# Patient Record
Sex: MALE | Race: AMERICAN INDIAN OR ALASKA NATIVE
[De-identification: names, ages, dates, MRNs, and addresses within clinical notes are randomized per-mention and may not be internally consistent; named-entity substitution may affect disease eponyms.]

---

## 2019-04-01 ENCOUNTER — HOSPITAL ENCOUNTER (OUTPATIENT)
Dept: HOSPITAL 5 - OR | Age: 29
Discharge: HOME | End: 2019-04-01
Attending: UROLOGY
Payer: COMMERCIAL

## 2019-04-01 VITALS — DIASTOLIC BLOOD PRESSURE: 63 MMHG | SYSTOLIC BLOOD PRESSURE: 105 MMHG

## 2019-04-01 DIAGNOSIS — N41.9: ICD-10-CM

## 2019-04-01 DIAGNOSIS — F41.9: ICD-10-CM

## 2019-04-01 DIAGNOSIS — Z88.5: ICD-10-CM

## 2019-04-01 DIAGNOSIS — R33.9: ICD-10-CM

## 2019-04-01 DIAGNOSIS — K21.9: ICD-10-CM

## 2019-04-01 DIAGNOSIS — Z79.899: ICD-10-CM

## 2019-04-01 DIAGNOSIS — Z87.891: ICD-10-CM

## 2019-04-01 DIAGNOSIS — Z87.440: ICD-10-CM

## 2019-04-01 DIAGNOSIS — N40.0: Primary | ICD-10-CM

## 2019-04-01 DIAGNOSIS — F32.9: ICD-10-CM

## 2019-04-01 PROCEDURE — 52005 CYSTO W/URTRL CATHJ: CPT

## 2019-04-01 PROCEDURE — C1758 CATHETER, URETERAL: HCPCS

## 2019-04-01 PROCEDURE — 74420 UROGRAPHY RTRGR +-KUB: CPT

## 2019-04-01 PROCEDURE — A4217 STERILE WATER/SALINE, 500 ML: HCPCS

## 2019-04-01 PROCEDURE — C1769 GUIDE WIRE: HCPCS

## 2019-04-01 RX ADMIN — MIDAZOLAM NR MG: 1 INJECTION INTRAMUSCULAR; INTRAVENOUS at 09:32

## 2019-04-01 RX ADMIN — MIDAZOLAM NR MG: 1 INJECTION INTRAMUSCULAR; INTRAVENOUS at 09:08

## 2019-04-01 NOTE — OPERATIVE REPORT
INDICATIONS:  The patient is a 28-year-old gentleman who has been told for quite

some time he has urinary and prostate issues.  His examination was unremarkable.

 He has been treated with antibiotics.  He had some sort of procedure years ago.

 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  He now presents for cystoscopy.  He is

worried about prostate issues.



DESCRIPTION OF PROCEDURE:  The patient was brought to the operating room and

placed on the operating table.  Following induction of anesthesia, placed in

lithotomy position, and prepped and draped in usual sterile fashion. 

Cystourethroscopy showed normal urethra, normal open bladder neck.  Prostatic

urethra was not long, measured approximately 2.5-3 cm.  Bladder was not

trabeculated.  There were no bladder lesions.  Retrograde showed no

hydronephrosis, no dilatation, good filling and drainage.  The patient tolerated

the procedure well.  No significant complication.  He was brought to recovery in

stable condition.





DD: 04/01/2019 11:35

DT: 04/01/2019 12:33

JOB# [id number]  [id number]

MARIO/NIKKY

## 2019-04-01 NOTE — FLUOROSCOPY REPORT
FLUOROSCOPY RETROGRADE UROGRAPHY:



HISTORY: Enlarged prostate, urinary retention.



FINDINGS: Fluoroscopy was provided by radiology during retrograde 

urography by the urologist. 5 fluoroscopic images were captured.



There is adequate filling of the ureters and intrarenal collecting 

systems with no filling defects or anatomic abnormalities identified. 

Please correlate with the procedural report if needed.



IMPRESSION: Retrograde pyelograms within normal limits.

## 2019-04-01 NOTE — ANESTHESIA CONSULTATION
Anesthesia Consult and Med Hx





- Airway


Anesthetic Teeth Evaluation: Good


ROM Head & Neck: Adequate


Mental/Hyoid Distance: Adequate


Mallampati Class: Class II


Intubation Access Assessment: Good





- Pulmonary Exam


CTA: Yes





- Cardiac Exam


Cardiac Exam: RRR





- Pre-Operative Health Status


ASA Pre-Surgery Classification: ASA2


Proposed Anesthetic Plan: General





- Pulmonary


Hx Smoking: Yes (STOPPED X 1 YRS ( 1 /2 PPD))


Hx Sleep Apnea: No (ROBERT PRE SCREEN LOW RISK.)





- Cardiovascular System


Hx Hypertension: No





- Other Systems


Hx Cancer: No

## 2019-04-01 NOTE — DISCHARGE SUMMARY
Short Stay Discharge Plan


Activity: no restrictions, other


Weight Bearing Status: Full Weight Bearing


Diet: regular


Special Instructions: other (inc fluids )


Follow up with: 


PRIMARY CARE,MD [Primary Care Provider] - 7 Days


WINDY LUCIO MD [Staff Physician] - 14 Days

## 2019-04-01 NOTE — POST OPERATIVE NOTE
Date of procedure: 04/01/19


Pre-op diagnosis: "prostatitis"


Post-op diagnosis: same


Findings: 





normal 


Procedure: 





cysto rpgs 


Anesthesia: GETA


Surgeon: WINDY LUCIO


Estimated blood loss: none


Pathology: none


Condition: stable


Disposition: PACU

## 2020-03-03 ENCOUNTER — HOSPITAL ENCOUNTER (EMERGENCY)
Dept: HOSPITAL 5 - ED | Age: 30
Discharge: HOME | End: 2020-03-03
Payer: COMMERCIAL

## 2020-03-03 VITALS — SYSTOLIC BLOOD PRESSURE: 156 MMHG | DIASTOLIC BLOOD PRESSURE: 78 MMHG

## 2020-03-03 DIAGNOSIS — Z88.5: ICD-10-CM

## 2020-03-03 DIAGNOSIS — Z79.899: ICD-10-CM

## 2020-03-03 DIAGNOSIS — F41.0: Primary | ICD-10-CM

## 2020-03-03 DIAGNOSIS — F17.200: ICD-10-CM

## 2020-03-03 DIAGNOSIS — K21.9: ICD-10-CM

## 2020-03-03 DIAGNOSIS — E87.6: ICD-10-CM

## 2020-03-03 DIAGNOSIS — F41.9: ICD-10-CM

## 2020-03-03 DIAGNOSIS — F12.10: ICD-10-CM

## 2020-03-03 DIAGNOSIS — Z98.890: ICD-10-CM

## 2020-03-03 LAB
ALBUMIN SERPL-MCNC: 4.9 G/DL (ref 3.9–5)
ALT SERPL-CCNC: 20 UNITS/L (ref 7–56)
BASOPHILS # (AUTO): 0.1 K/MM3 (ref 0–0.1)
BASOPHILS NFR BLD AUTO: 1.1 % (ref 0–1.8)
BENZODIAZEPINES SCREEN,URINE: (no result)
BILIRUB UR QL STRIP: (no result)
BLOOD UR QL VISUAL: (no result)
BUN SERPL-MCNC: 11 MG/DL (ref 9–20)
BUN SERPL-MCNC: 11 MG/DL (ref 9–20)
BUN/CREAT SERPL: 14 %
BUN/CREAT SERPL: 14 %
CALCIUM SERPL-MCNC: 9.8 MG/DL (ref 8.4–10.2)
CALCIUM SERPL-MCNC: 9.9 MG/DL (ref 8.4–10.2)
EOSINOPHIL # BLD AUTO: 0 K/MM3 (ref 0–0.4)
EOSINOPHIL NFR BLD AUTO: 0.4 % (ref 0–4.3)
HCT VFR BLD CALC: 45.4 % (ref 35.5–45.6)
HEMOLYSIS INDEX: 14
HEMOLYSIS INDEX: 15
HGB BLD-MCNC: 15.6 GM/DL (ref 11.8–15.2)
LYMPHOCYTES # BLD AUTO: 2.2 K/MM3 (ref 1.2–5.4)
LYMPHOCYTES NFR BLD AUTO: 26.7 % (ref 13.4–35)
MCHC RBC AUTO-ENTMCNC: 34 % (ref 32–34)
MCV RBC AUTO: 93 FL (ref 84–94)
METHADONE SCREEN,URINE: (no result)
MONOCYTES # (AUTO): 0.4 K/MM3 (ref 0–0.8)
MONOCYTES % (AUTO): 5.1 % (ref 0–7.3)
MUCOUS THREADS #/AREA URNS HPF: (no result) /HPF
OPIATE SCREEN,URINE: (no result)
PH UR STRIP: 7 [PH] (ref 5–7)
PLATELET # BLD: 287 K/MM3 (ref 140–440)
PROT UR STRIP-MCNC: (no result) MG/DL
RBC # BLD AUTO: 4.9 M/MM3 (ref 3.65–5.03)
RBC #/AREA URNS HPF: < 1 /HPF (ref 0–6)
UROBILINOGEN UR-MCNC: < 2 MG/DL (ref ?–2)
WBC #/AREA URNS HPF: < 1 /HPF (ref 0–6)

## 2020-03-03 PROCEDURE — 93010 ELECTROCARDIOGRAM REPORT: CPT

## 2020-03-03 PROCEDURE — 80048 BASIC METABOLIC PNL TOTAL CA: CPT

## 2020-03-03 PROCEDURE — 80053 COMPREHEN METABOLIC PANEL: CPT

## 2020-03-03 PROCEDURE — 99284 EMERGENCY DEPT VISIT MOD MDM: CPT

## 2020-03-03 PROCEDURE — 36415 COLL VENOUS BLD VENIPUNCTURE: CPT

## 2020-03-03 PROCEDURE — 80320 DRUG SCREEN QUANTALCOHOLS: CPT

## 2020-03-03 PROCEDURE — 93005 ELECTROCARDIOGRAM TRACING: CPT

## 2020-03-03 PROCEDURE — G0480 DRUG TEST DEF 1-7 CLASSES: HCPCS

## 2020-03-03 PROCEDURE — 81001 URINALYSIS AUTO W/SCOPE: CPT

## 2020-03-03 PROCEDURE — 70450 CT HEAD/BRAIN W/O DYE: CPT

## 2020-03-03 PROCEDURE — 80307 DRUG TEST PRSMV CHEM ANLYZR: CPT

## 2020-03-03 PROCEDURE — 85025 COMPLETE CBC W/AUTO DIFF WBC: CPT

## 2020-03-03 PROCEDURE — 84443 ASSAY THYROID STIM HORMONE: CPT

## 2020-03-03 PROCEDURE — 82550 ASSAY OF CK (CPK): CPT

## 2020-03-03 NOTE — CAT SCAN REPORT
CT head/brain wo con



INDICATION / CLINICAL INFORMATION:

29 years Male; AMS.



TECHNIQUE: Routine CT head without contrast. All CT scans at this location are performed using CT dos
e reduction for ALARA by means of automated exposure control.



COMPARISON: 

None.



FINDINGS:



BRAIN / INTRACRANIAL CONTENTS: No acute hemorrhage, mass effect, midline shift, hydrocephalus, or acu
te, large territorial infarct. No chronic infarct or atrophy appreciated. No significant white matter
 abnormality.



CRANIOCERVICAL JUNCTION: No significant abnormality.



ORBITS: No significant abnormality of visualized orbits.

SINUSES / MASTOIDS: Mucous retention cyst/polyp seen in the right maxillary antrum. Partial opacifica
tion of the mastoid seen on the right with small air-fluid level.



ADDITIONAL FINDINGS: None. 



IMPRESSION:

1. No focal mass, hemorrhage, hydrocephalus, or acute, large territorial infarct. 



Signer Name: Sree Cohen MD, III 

Signed: 3/3/2020 8:28 PM

Workstation Name: [id number]

## 2020-03-03 NOTE — EMERGENCY DEPARTMENT REPORT
ED Psych HPI





- General


Chief Complaint: Neuro Symptoms/Deficit


Stated Complaint: RT SIDE NUMB/TINGLE CANT MOVE


Time Seen by Provider: 03/03/20 16:42


Source: patient


Mode of arrival: Ambulatory





- History of Present Illness


Initial Comments: 





Patient is a 29-year-old  male who comes to the emergency room 

tachypneic, diaphoretic and extremely anxious.  In triage she looked to be in 

distress.  On further exam it appears that the patient has an acute on chronic 

history of anxiety and panic attacks.  Patient states that he had an anxiety 

attack on Sunday and that today he called his doctor who told him that he should

be seen in the emergency room.  Patient is tachypneic and complaining of 

numbness of his fingers and toes.  He states that his fingers are curling up.  P

atient has no facial droop no pronator drift on exam.  Patient endorses 

occasional alcohol, daily cigarettes and marijuana use for his anxiety.  He last

had marijuana this morning.





Patient lives with his .





Patient denies any daily medications





Patient states his last HIV test was last year and negative.  Review of the EMR 

indicates that he at one time he was on antivirals prophylactically but he 

denies taking them at this time.





Patient denies SI or HI.  Patient denies auditory or visual hallucinations.  

Patient has no history of psychosis or schizophrenia.





He is  and his legal next of kin is his partner who is in the room with 

him.


-: Gradual, days(s)


Associated Psychiatric Symptoms: none


History of same: Yes


Improves With: none


Worsens With: none


Context: recent drug abuse


Associated Symptoms: shortness of breath.  denies: confusion, headache, nausea, 

vomiting, syncope, insomnia


Treatments Prior to Arrival: none





- Related Data


                                  Previous Rx's











 Medication  Instructions  Recorded  Last Taken  Type


 


hydrOXYzine PAMOATE [Vistaril] 25 mg PO Q6HR PRN #10 capsule 03/03/20 Unknown Rx











                                    Allergies











Allergy/AdvReac Type Severity Reaction Status Date / Time


 


codeine Allergy  Itching Verified 03/25/19 11:46














ED Review of Systems


ROS: 


Stated complaint: RT SIDE NUMB/TINGLE CANT MOVE


Other details as noted in HPI





Comment: All other systems reviewed and negative





ED Past Medical Hx





- Past Medical History


Previous Medical History?: Yes


Hx Hypertension: No


Hx GERD: Yes (PRN MEDS)


Hx Psychiatric Treatment: Yes (ANXIETY)


Hx HIV: No


Additional medical history: Bell's Palsy





- Surgical History


Past Surgical History?: Yes


Additional Surgical History: T/A; HPV REMOVAL 2010





- Family History


Family history: no significant





- Social History


Smoking Status: Current Every Day Smoker


Substance Use Type: Alcohol, Marijuana (FOR ANXIETY AND NAUSEA; LAST THIS AM)





- Medications


Home Medications: 


                                Home Medications











 Medication  Instructions  Recorded  Confirmed  Last Taken  Type


 


hydrOXYzine PAMOATE [Vistaril] 25 mg PO Q6HR PRN #10 capsule 03/03/20  Unknown 

Rx














ED Physical Exam





- General


Limitations: No Limitations


General appearance: alert, anxious, other (DIAPHORETIC)





- Head


Head exam: Present: atraumatic, normocephalic





- Eye


Eye exam: Present: normal appearance, PERRL, EOMI





- ENT


ENT exam: Present: mucous membranes moist





- Neck


Neck exam: Present: normal inspection





- Respiratory


Respiratory exam: Present: normal lung sounds bilaterally.  Absent: respiratory 

distress





- Cardiovascular


Cardiovascular Exam: Present: regular rate, normal rhythm, tachycardia.  Absent:

 systolic murmur, diastolic murmur, rubs, gallop





- GI/Abdominal


GI/Abdominal exam: Present: soft, normal bowel sounds





- Rectal


Rectal exam: Present: deferred





- Extremities Exam


Extremities exam: Present: normal inspection





- Back Exam


Back exam: Present: normal inspection





- Neurological Exam


Neurological exam: Present: alert, oriented X3





- Psychiatric


Psychiatric exam: Present: anxious





- Skin


Skin exam: Present: warm, intact, diaphoretic, pallor.  Absent: rash





ED Course


                                   Vital Signs











  03/03/20





  16:00


 


Temperature 97.5 F L


 


Pulse Rate 92 H


 


Respiratory 24





Rate 


 


Blood Pressure 156/78


 


O2 Sat by Pulse 100





Oximetry 














- Reevaluation(s)


Reevaluation #1: 





03/03/20 18:38


STAFFED WITH DR SAMUELS





WHEN CT READ AND NEG PT TO DC HOME WITH [facility] REFERRAL





ED Medical Decision Making





- Lab Data


Result diagrams: 


                                 03/03/20 16:24





                                 03/03/20 16:42





- EKG Data


-: EKG Interpreted by Me


EKG shows normal: sinus rhythm


Rate: normal





- EKG Data


When compared to previous EKG there are: no significant change


Interpretation: no acute changes





- Radiology Data


Radiology results: report reviewed, image reviewed





- Medical Decision Making








Labs











  03/03/20 03/03/20 03/03/20





  16:24 16:24 16:24


 


WBC   


 


RBC   


 


Hgb   


 


Hct   


 


MCV   


 


MCH   


 


MCHC   


 


RDW   


 


Plt Count   


 


Lymph % (Auto)   


 


Mono % (Auto)   


 


Eos % (Auto)   


 


Baso % (Auto)   


 


Lymph #   


 


Mono #   


 


Eos #   


 


Baso #   


 


Seg Neutrophils %   


 


Seg Neutrophils #   


 


Sodium    140


 


Potassium    3.5 L


 


Chloride    103.4


 


Carbon Dioxide    20 L


 


Anion Gap    20


 


BUN    11


 


Creatinine    0.8


 


Estimated GFR    > 60


 


BUN/Creatinine Ratio    14


 


Glucose    115 H


 


Calcium    9.9


 


Total Bilirubin   


 


AST   


 


ALT   


 


Alkaline Phosphatase   


 


Total Creatine Kinase   


 


Total Protein   


 


Albumin   


 


Albumin/Globulin Ratio   


 


Salicylates  < 0.3 L  


 


Acetaminophen   < 5.0 L 


 


Plasma/Serum Alcohol   














  03/03/20 03/03/20 03/03/20





  16:24 16:24 16:42


 


WBC   8.3 


 


RBC   4.90 


 


Hgb   15.6 H 


 


Hct   45.4 


 


MCV   93 


 


MCH   32 


 


MCHC   34 


 


RDW   12.4 L 


 


Plt Count   287 


 


Lymph % (Auto)   26.7 


 


Mono % (Auto)   5.1 


 


Eos % (Auto)   0.4 


 


Baso % (Auto)   1.1 


 


Lymph #   2.2 


 


Mono #   0.4 


 


Eos #   0.0 


 


Baso #   0.1 


 


Seg Neutrophils %   66.7 


 


Seg Neutrophils #   5.5 


 


Sodium    141


 


Potassium    3.5 L


 


Chloride    103.8


 


Carbon Dioxide    19 L


 


Anion Gap    22


 


BUN    11


 


Creatinine    0.8


 


Estimated GFR    > 60


 


BUN/Creatinine Ratio    14


 


Glucose    117 H


 


Calcium    9.8


 


Total Bilirubin    0.50


 


AST    16


 


ALT    20


 


Alkaline Phosphatase    74


 


Total Creatine Kinase    75


 


Total Protein    7.0


 


Albumin    4.9


 


Albumin/Globulin Ratio    2.3


 


Salicylates   


 


Acetaminophen   


 


Plasma/Serum Alcohol  < 0.01  











                                   Vital Signs











  03/03/20





  16:00


 


Temperature 97.5 F L


 


Pulse Rate 92 H


 


Respiratory 24





Rate 


 


Blood Pressure 156/78


 


O2 Sat by Pulse 100





Oximetry 








PLAN


1. MEDICALLY CLEAR WITH LABS/UA/CT


2. MHE FOR RESOURCES FOR ANXIETY/PANIC DISORDER








1800


TREATED FOR HYPOKALEMIA WITH PO K





LABS NOTED





UA AND UDS NOTED





CT HEAD READ PENDING





TSH NORMAL 





PLAN 1830


CT - WHEN NEG ---DC HOME WITH RX FOR [facility] AND [facility] REFERRAL





1830 PT AND FAMILY UPDATED ON PLAN OF CARE


PT CALM AT THIS TIME. 





- Differential Diagnosis


PANIC ATTACK/HYPERTHYROID/IC PROCESS


Critical care attestation.: 


If time is entered above; I have spent that time in minutes in the direct care 

of this critically ill patient, excluding procedure time.








ED Disposition


Clinical Impression: 


 Panic attack, Hypokalemia





Disposition: DC-01 TO HOME OR SELFCARE


Is pt being admited?: No


Does the pt Need Aspirin: No


Condition: Stable


Instructions:  Hypokalemia (ED), Cannabis Abuse  (ED), Anxiety (ED)


Additional Instructions: 


AVOID ALCOHOL AND DRUGS





FOLLOW UP WITH PCP AND [facility]- SEE ATTACHED 


FOR ASSISTANCE WITH ANXIETY 





USE [facility] IF NEEDED FOR ATTACKS LIKE TODAY





DIET AS TOLERATED


AVOID CAFFEINE 





WORK ON LIMITING STRESS








Prescriptions: 


hydrOXYzine PAMOATE [Vistaril] 25 mg PO Q6HR PRN #10 capsule


 PRN Reason: Anxiety


Referrals: 


[facility] [Primary Care Provider[facility] - 3-5 Days


GREG MALLORY MD [Staff Physician] - 3-5 Days


Time of Disposition: 17:56